# Patient Record
Sex: FEMALE | Race: ASIAN
[De-identification: names, ages, dates, MRNs, and addresses within clinical notes are randomized per-mention and may not be internally consistent; named-entity substitution may affect disease eponyms.]

---

## 2019-10-28 ENCOUNTER — HOSPITAL ENCOUNTER (OUTPATIENT)
Dept: HOSPITAL 74 - JASU-SURG | Age: 39
Discharge: HOME | End: 2019-10-28
Attending: OBSTETRICS & GYNECOLOGY
Payer: COMMERCIAL

## 2019-10-28 VITALS — TEMPERATURE: 97.9 F | HEART RATE: 85 BPM | SYSTOLIC BLOOD PRESSURE: 110 MMHG | DIASTOLIC BLOOD PRESSURE: 64 MMHG

## 2019-10-28 VITALS — BODY MASS INDEX: 29 KG/M2

## 2019-10-28 DIAGNOSIS — N92.0: ICD-10-CM

## 2019-10-28 DIAGNOSIS — N84.0: Primary | ICD-10-CM

## 2019-10-28 PROCEDURE — 0UDB8ZX EXTRACTION OF ENDOMETRIUM, VIA NATURAL OR ARTIFICIAL OPENING ENDOSCOPIC, DIAGNOSTIC: ICD-10-PCS | Performed by: OBSTETRICS & GYNECOLOGY

## 2019-10-28 PROCEDURE — 0UB98ZX EXCISION OF UTERUS, VIA NATURAL OR ARTIFICIAL OPENING ENDOSCOPIC, DIAGNOSTIC: ICD-10-PCS | Performed by: OBSTETRICS & GYNECOLOGY

## 2019-10-28 NOTE — OP
Operative Note





- Note:


Operative Date: 10/28/19


Pre-Operative Diagnosis: Endometrial polyp.  menorrhagia


Operation: Hysteroscopic myomectomy.  Suction DC


Post-Operative Diagnosis: Same as Pre-op


Surgeon: Jesenia Alberto


Anesthesia: General


Estimated Blood Loss (mls): 10


Operative Report Dictated: Yes

## 2019-10-28 NOTE — HP
History & Physical Update





- History


History: No Change





- Physical


Physical: No Change





- Assessment


Assessment: No Change





- Plan


Plan: No Change (No change in HP)

## 2019-10-29 NOTE — OP
DATE OF OPERATION:  10/28/2019

 

PREOPERATIVE DIAGNOSIS:  Endometrial polyp and menorrhagia.

 

OPERATION:  Hysteroscopic myomectomy, suction dilation and curettage.

 

POSTOPERATIVE DIAGNOSIS:  Endometrial polyp and menorrhagia.

 

SURGEON:  Jesenia Alberto MD 

 

ANESTHESIA:  General.

 

PROCEDURE:  Patient was taken to the operating room, placed in dorsal lithotomy

position, prepped and draped in the usual sterile fashion.  A time-out was performed

in accordance with hospital regulation.  Speculum was placed in the vagina.  Anterior

lip of the cervix was grasped with a single-tooth tenaculum.  Cervix then dilated to

accommodate the operative hysteroscope.  Operative hysteroscope was inserted. 

Visualization revealed endometrial polyps.  Cautery and cutting of the endometrial

polyps was then done followed by suction dilation and curettage.  Specimen was

submitted to Pathology.  All instruments were then removed.  Patient tolerated

procedure well.  Estimated blood loss was 20 mL.

 

 

JESENIA ALBERTO M.D.

 

[id number]

DD: 10/29/2019 09:01

DT: 10/29/2019 12:50

Job #:  [id number]

## 2019-10-30 NOTE — PATH
Surgical Pathology Report



Patient Name:  YUDY BERRY

Accession #:  [id number]

[facility]. Rec. #:  [id number]                                                        

   /Age/Gender:  1980 (Age: 39) / F

Account:  [id number]                                                          

             Location: [facility] SURGICAL

Taken:  10/28/2019

Received:  10/29/2019

Reported:  10/30/2019

Physicians:  Jesenia Alberto M.D.

  



Specimen(s) Received

 ENDOMETRIAL POLYP AND CURETTINGS 





Clinical History

Endometrial polyp, menorrhagia







Final Diagnosis

ENDOMETRIAL POLYPS AND FRAGMENTS:

ENDOMETRIAL POLYP.

SEPARATE SECRETORY TYPE ENDOMETRIUM.





***Electronically Signed***

Laurence Mace M.D.





Gross Description

Received in formalin labeled "endometrial polyps and curettings," is a 2.6 x 2.1

x 0.3 cm aggregate of tan soft tissue fragments. The formalin is filtered and

the specimen is entirely submitted in one cassette.

DL/10/29/2019



saudi/10/29/2019

## 2021-12-30 ENCOUNTER — HOSPITAL ENCOUNTER (EMERGENCY)
Dept: HOSPITAL 74 - JER | Age: 41
Discharge: HOME | End: 2021-12-30
Payer: COMMERCIAL

## 2021-12-30 VITALS — DIASTOLIC BLOOD PRESSURE: 81 MMHG | HEART RATE: 89 BPM | SYSTOLIC BLOOD PRESSURE: 122 MMHG | TEMPERATURE: 97.8 F

## 2021-12-30 VITALS — BODY MASS INDEX: 27.1 KG/M2

## 2021-12-30 DIAGNOSIS — J04.0: Primary | ICD-10-CM
